# Patient Record
Sex: FEMALE | Race: WHITE | HISPANIC OR LATINO | URBAN - METROPOLITAN AREA
[De-identification: names, ages, dates, MRNs, and addresses within clinical notes are randomized per-mention and may not be internally consistent; named-entity substitution may affect disease eponyms.]

---

## 2018-05-29 ENCOUNTER — EMERGENCY (EMERGENCY)
Facility: HOSPITAL | Age: 38
LOS: 1 days | Discharge: HOME | End: 2018-05-29
Attending: EMERGENCY MEDICINE | Admitting: EMERGENCY MEDICINE
Payer: COMMERCIAL

## 2018-05-29 VITALS
OXYGEN SATURATION: 98 % | HEART RATE: 65 BPM | TEMPERATURE: 99 F | DIASTOLIC BLOOD PRESSURE: 78 MMHG | SYSTOLIC BLOOD PRESSURE: 119 MMHG | RESPIRATION RATE: 17 BRPM

## 2018-05-29 VITALS
TEMPERATURE: 98 F | OXYGEN SATURATION: 98 % | HEIGHT: 61.81 IN | SYSTOLIC BLOOD PRESSURE: 133 MMHG | WEIGHT: 116.84 LBS | DIASTOLIC BLOOD PRESSURE: 84 MMHG | HEART RATE: 61 BPM | RESPIRATION RATE: 17 BRPM

## 2018-05-29 DIAGNOSIS — F41.9 ANXIETY DISORDER, UNSPECIFIED: ICD-10-CM

## 2018-05-29 DIAGNOSIS — R20.0 ANESTHESIA OF SKIN: ICD-10-CM

## 2018-05-29 DIAGNOSIS — R25.1 TREMOR, UNSPECIFIED: ICD-10-CM

## 2018-05-29 DIAGNOSIS — R13.10 DYSPHAGIA, UNSPECIFIED: ICD-10-CM

## 2018-05-29 PROCEDURE — 99282 EMERGENCY DEPT VISIT SF MDM: CPT

## 2018-05-29 PROCEDURE — 99283 EMERGENCY DEPT VISIT LOW MDM: CPT | Mod: 25

## 2018-05-29 PROCEDURE — 99053 MED SERV 10PM-8AM 24 HR FAC: CPT

## 2018-05-29 NOTE — ED PROVIDER NOTE - PROGRESS NOTE DETAILS
patient refusing labwork and CT scan.  she thinks she is having an MS relapse and will  follow up with her neurologist in [place], going home this week.. explained possibility of other diagnoses, including stroke and other life-threatening conditions, though admittedly no significant abnormalities on exam.  she wants to sign out AMA.

## 2018-05-29 NOTE — ED ADULT TRIAGE NOTE - CHIEF COMPLAINT QUOTE
woke up 1 hr ago with left hand numbness, nausea, body shaking, mouth numbness, swollen in throat, difficulty swallowing

## 2018-05-29 NOTE — ED PROVIDER NOTE - PHYSICAL EXAMINATION
CONSTITUTIONAL: WD,WN. NAD.    SKIN: Normal color and turgor. No rash.    HEAD: NC/AT.  EYES: Conjunctiva clear. EOMI. PERRL.    ENT: Airway patent, OP without erythema, tonsillar swelling or exudate; uvula midline without swelling. Nasal mucosa clear, no rhinorrhea.   RESPIRATORY:  Breathing non-labored. No retractions or accessory muscle use.  Lungs CTA bilat.  CARDIOVASCULAR:  RRR, S1S2. No M/R/G.      GI:  Abdomen soft, nontender.    MSK: Neck supple with painless ROM.  No extremity edema or tenderness.  No joint swelling or ROM limitation.  NEURO: Alert and oriented; CN II-XII intact. Speech clear. 5/5 strength in all extremities.  SILT in all extremities, including area of left hand and arm where pt has symptoms.  Normal balance and gait. F-N intact.  Romberg negative.

## 2018-05-29 NOTE — ED PROVIDER NOTE - OBJECTIVE STATEMENT
36 yo fem with Hx multiple sclerosis, apx 5 remissions since 2005, last time in 2015 - visiting [place] from [place] on vacation; was in her [facility] when she went to bed at 1 am; she was awakened by a sensation of numbness in her left dorsal forearm and hand 1 hour ago.  She got up and washed up in the bathroom, and she began to feel shaky and anxious.  She had some palpitations and felt perioral paresthesias.  She also has a sense that she is not swallowing normally; she was able to drink water without any choking, but says it feels abnormal for her.  She is concerned that she is having a MS relapse.  No headache or other focal neuro symptoms.

## 2018-05-29 NOTE — ED PROVIDER NOTE - MEDICAL DECISION MAKING DETAILS
pt with Hx of MS woke 1 hr earlier with numbness in left forearm; she subsequently had an episode of tremulousness, palpitations, numbness around mouth, and sensation of abnormal swallowing.  arm numbness may be MS flare and may have anxiety causing additional symptoms.  will get CT to r/o immediate life threatening conditions.

## 2018-05-29 NOTE — ED ADULT NURSE NOTE - OBJECTIVE STATEMENT
pt. with PMH of MS presented with c/o Lt hand numbness, nausea, shaking, palpitations and sensation of swelling to throat and mouth around 3am. pt. is A&Ox3, communicates w/o difficulty, breathing unlabored, skin warm, dry, lungs clear, full ROM, no motor or sensory deficits. pt. reports that her symptoms improved, denies chest pain, headache, fever, chills, n/v/d, dizziness, difficulty breathing, urinary symptoms, weakness.

## 2018-05-29 NOTE — ED PROVIDER NOTE - ATTENDING CONTRIBUTION TO CARE
Pt w/ PMHx MS, visiting from [place], awoke 1 hour ago by discomfort in L arm, numbness in mid-forearm down to fingers. After getting up, she began having gen shaking, anxious, difficulty swallowing, numbness in b/l face. Pt concerned for MS flare. Pt w/ PMHx MS, visiting from [place], awoke 1 hour ago by discomfort in L arm, numbness in mid-forearm down to fingers. After getting up, she began having gen shaking, anxious, difficulty swallowing, numbness in b/l face. Pt concerned for MS flare.  VITAL SIGNS: I have reviewed nursing notes and confirm.  CONSTITUTIONAL: Well-developed; well-nourished; in no acute distress.  SKIN: warm and dry, no acute rash.  HEAD: Normocephalic; atraumatic.  EYES: PERRL, EOM intact; conjunctiva and sclera clear.  ENT: airway clear.  NECK: Supple; non tender.  CARD: S1, S2 normal; no murmurs, gallops, or rubs. Regular rate and rhythm.  RESP: No wheezes, rales or rhonchi.  ABD: Normal bowel sounds; soft; non-distended; non-tender; no hepatosplenomegaly.  EXT: Normal ROM. No clubbing, cyanosis or edema.  NEURO: Alert, oriented. CN II-XII intact, DWYER, normal gait, Grossly unremarkable.  PSYCH: Cooperative, appropriate.   Pt p/w multiple complaints. No findings on exam. Possible MS flare. Pt visiting from [place]. D/w pt she will need to seen her neurologist ASAP, possible repeat MRI. Offered pt labs, CT at this time, which pt has declined. Pt will see her doctor immediately upon returning to [place] later this week.

## 2018-05-29 NOTE — ED PROVIDER NOTE - NS ED ROS FT
CONSTITUTIONAL: No fever, chills, or weakness  NEURO: hpi  EYES: No visual changes  ENT: No rhinorrhea or sore throat  PULM: No cough or dyspnea  CV: No chest pain or palpitations  GI: No abdominal pain, vomiting, or diarrhea  : No dysuria, hematuria, frequency  MSK: No neck pain or back pain, no joint pain  SKIN: no rash
